# Patient Record
Sex: MALE | Race: ASIAN
[De-identification: names, ages, dates, MRNs, and addresses within clinical notes are randomized per-mention and may not be internally consistent; named-entity substitution may affect disease eponyms.]

---

## 2018-05-01 PROBLEM — Z00.00 ENCOUNTER FOR PREVENTIVE HEALTH EXAMINATION: Status: ACTIVE | Noted: 2018-05-01

## 2018-05-15 ENCOUNTER — APPOINTMENT (OUTPATIENT)
Dept: COLORECTAL SURGERY | Facility: CLINIC | Age: 48
End: 2018-05-15

## 2018-05-29 ENCOUNTER — APPOINTMENT (OUTPATIENT)
Dept: COLORECTAL SURGERY | Facility: CLINIC | Age: 48
End: 2018-05-29
Payer: COMMERCIAL

## 2018-05-29 VITALS
WEIGHT: 315 LBS | HEIGHT: 67 IN | BODY MASS INDEX: 49.44 KG/M2 | HEART RATE: 57 BPM | OXYGEN SATURATION: 98 % | DIASTOLIC BLOOD PRESSURE: 83 MMHG | SYSTOLIC BLOOD PRESSURE: 133 MMHG | RESPIRATION RATE: 14 BRPM

## 2018-05-29 DIAGNOSIS — K62.5 HEMORRHAGE OF ANUS AND RECTUM: ICD-10-CM

## 2018-05-29 DIAGNOSIS — Z86.39 PERSONAL HISTORY OF OTHER ENDOCRINE, NUTRITIONAL AND METABOLIC DISEASE: ICD-10-CM

## 2018-05-29 DIAGNOSIS — K64.9 UNSPECIFIED HEMORRHOIDS: ICD-10-CM

## 2018-05-29 PROCEDURE — 46600 DIAGNOSTIC ANOSCOPY SPX: CPT

## 2018-05-29 PROCEDURE — 99204 OFFICE O/P NEW MOD 45 MIN: CPT | Mod: 25

## 2018-05-29 RX ORDER — LISINOPRIL 2.5 MG/1
2.5 TABLET ORAL
Qty: 30 | Refills: 0 | Status: DISCONTINUED | COMMUNITY
Start: 2018-04-26

## 2018-05-29 RX ORDER — ATORVASTATIN CALCIUM 10 MG/1
10 TABLET, FILM COATED ORAL
Qty: 90 | Refills: 0 | Status: ACTIVE | COMMUNITY
Start: 2018-04-26

## 2019-03-20 ENCOUNTER — APPOINTMENT (OUTPATIENT)
Dept: PULMONOLOGY | Facility: CLINIC | Age: 49
End: 2019-03-20
Payer: MEDICAID

## 2019-03-20 VITALS
WEIGHT: 200 LBS | RESPIRATION RATE: 15 BRPM | BODY MASS INDEX: 31.39 KG/M2 | TEMPERATURE: 97.2 F | SYSTOLIC BLOOD PRESSURE: 150 MMHG | DIASTOLIC BLOOD PRESSURE: 90 MMHG | HEIGHT: 67 IN | HEART RATE: 54 BPM

## 2019-03-20 DIAGNOSIS — F17.210 NICOTINE DEPENDENCE, CIGARETTES, UNCOMPLICATED: ICD-10-CM

## 2019-03-20 DIAGNOSIS — Z78.9 OTHER SPECIFIED HEALTH STATUS: ICD-10-CM

## 2019-03-20 DIAGNOSIS — Z83.3 FAMILY HISTORY OF DIABETES MELLITUS: ICD-10-CM

## 2019-03-20 PROCEDURE — 99205 OFFICE O/P NEW HI 60 MIN: CPT

## 2019-03-20 RX ORDER — HYDROCORTISONE 25 MG/G
2.5 CREAM TOPICAL
Qty: 30 | Refills: 5 | Status: DISCONTINUED | COMMUNITY
Start: 2018-05-29 | End: 2019-03-20

## 2019-03-20 RX ORDER — PSYLLIUM HUSK 0.4 G
CAPSULE ORAL
Refills: 0 | Status: ACTIVE | COMMUNITY

## 2019-03-20 NOTE — PHYSICAL EXAM
[Normal Appearance] : normal appearance [Well Groomed] : well groomed [General Appearance - In No Acute Distress] : no acute distress [Normal Conjunctiva] : the conjunctiva exhibited no abnormalities [Low Lying Soft Palate] : low lying soft palate [Elongated Uvula] : elongated uvula [Enlarged Base of the Tongue] : enlargement of the base of the tongue [III] : III [Neck Appearance] : the appearance of the neck was normal [Neck Circumference: ___] : neck circumference is [unfilled] [Heart Rate And Rhythm] : heart rate was normal and rhythm regular [Murmurs] : no murmurs [] : no respiratory distress [Auscultation Breath Sounds / Voice Sounds] : lungs were clear to auscultation bilaterally [Involuntary Movements] : no involuntary movements were seen [No Focal Deficits] : no focal deficits [Affect] : the affect was normal [Mood] : the mood was normal [FreeTextEntry2] : no edema

## 2019-03-20 NOTE — REVIEW OF SYSTEMS
[Obesity] : obesity [Diabetes] : diabetes  [Negative] : Psychiatric [Fatigue] : no fatigue [Nasal Congestion] : no nasal congestion [Shortness Of Breath] : no shortness of breath [Palpitations] : no palpitations [Chest Pain] : no chest pain [FreeTextEntry9] : HTN, HLD [Thyroid Disease] : no thyroid disease

## 2019-03-20 NOTE — ASSESSMENT
[FreeTextEntry1] : 49 year old MAIKEL BLOOM, with HTN, HLD, DM, obesity, and a long history of obstructive sleep apnea of unknown degree, treated with CPAP, pressure unknown, presents for re-evaluation of MATHEUS.\par \par The patient derives benefit from CPAP use with improvement in sleepiness and energy.  He is experiencing mask dislodgement and leakage.  He was referred to [facility] for a diagnostic sleep study to evaluate his current degree of MATHEUS.  He will follow-up with our center in 1-week, and our office 2-weeks after the study - if he has not been contacted by then.\par \par The ramifications of MATHEUS, the benefit from CPAP to reduce medical risk, to improve sleep and daytime function, were discussed.  Treatment options will be reviewed upon receipt of his PSG report.\par \par Sleep hygiene measures were reviewed, including regularization of his sleep schedule, avoidance of engaging activities late at night, and winding down 1-hour before the desired bedtime.  He was advised on how to advance his bedtime by 1/2 hour every 3-nights if successful, with a goal of as close to 7.5 hours of sleep as is feasible.\par \par He will resume antihypertensive and atorvastatin medications.

## 2019-03-20 NOTE — HISTORY OF PRESENT ILLNESS
[Daytime Somnolence] : daytime somnolence [ESS: ___] : ESS score [unfilled] [Unintentional Sleep While Inactive] : unintentional sleep while inactive [To Bed: ___] : ~he/she~ goes to bed at [unfilled] [Arises: ___] : arises at [unfilled] [Sleep Onset Latency: ___ minutes] : sleep onset latency of [unfilled] minutes reported [Nocturnal Awakenings: ___] : ~he/she~ typically has [unfilled] nocturnal awakenings [TST: ___] : Total sleep time is [unfilled] [Date: ___] : Date of most recent diagnostic polysomnogram: [unfilled] [Daytime Sleep: ___] : daytime sleep: [unfilled] [CPAP: ___ cmH2O] : CPAP: [unfilled] cmH2O [Snoring] : no snoring [FreeTextEntry1] : This 49 year old male was referred by his PCP Dr. Maribell Baker, for management of obstructive sleep apnea.\par \par COMORBID:  Hypertension, hyperlipidemia, diabetes, obesity.\par \par The patient has been on CPAP therapy since he was diagnosed with MATHEUS in [place] > 10 years ago.  The reports are not available for review.  He reports nightly CPAP use for 5 to 6 hours, with less sleepiness and more energy.  On long drives of >1.5 hours, he experiences drowsy driving despite CPAP use.  His mask dislodges, and the noise from mask leakage disturbs his wife's sleep.  He is utilizing an approximate 3-year old self-purchased ResMed machine, with a nasal pillows mask. \par \par He sleeps 6 to 7 hours between 1-8AM.  On his days off, he arises at 10AM and feels more refreshed.  Once weekly his bedtime is later, betweeen 2 and 5AM, resulting in 4-hours of sleep.  Approximately once monthly he naps for 10-15 minutes at 2-3PM on the days following 4-hours of sleep.  Naps are refreshing.  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  He smokes <1  cigarette twice monthly, 1 glass of wine daily, 1-2 caffeinated coffes daily as late as 9pm, and denies illicit drug history.   He is not compliant with antihypertensive and atorvastatin prescribed.      [Witnessed Apneas] : no witnessed sleep apnea [Frequent Nocturnal Awakening] : no nocturnal awakening [Unintentional Sleep while Active] : no unintentional sleep while active [Awakes Unrefreshed] : does not awake unrefreshed [Awakes with Headache] : no headache upon awakening [Awakening With Dry Mouth] : no dry mouth upon awakening [Recent  Weight Gain] : no recent weight gain [DIS] : no DIS [DMS] : no DMS [Cataplexy] : no cataplexy [Unusual Sleep Behavior] : no unusual sleep behavior [Sleep Paralysis] : no sleep paralysis [Hypnagogic Hallucinations] : no hallucinations when falling asleep [Hypnopompic Hallucinations] : no hallucinations when awakening [Lower Extremity Discomfort] : no lower extremity discomfort in evening or at bedtime [Nocturnal Oxygen] : The patient does not use nocturnal oxygen

## 2019-04-29 ENCOUNTER — APPOINTMENT (OUTPATIENT)
Dept: SLEEP CENTER | Facility: CLINIC | Age: 49
End: 2019-04-29

## 2019-05-17 ENCOUNTER — OUTPATIENT (OUTPATIENT)
Dept: OUTPATIENT SERVICES | Facility: HOSPITAL | Age: 49
LOS: 1 days | End: 2019-05-17
Payer: MEDICAID

## 2019-05-17 ENCOUNTER — APPOINTMENT (OUTPATIENT)
Dept: SLEEP CENTER | Facility: CLINIC | Age: 49
End: 2019-05-17
Payer: MEDICAID

## 2019-05-17 PROCEDURE — 95811 POLYSOM 6/>YRS CPAP 4/> PARM: CPT

## 2019-05-17 PROCEDURE — 95811 POLYSOM 6/>YRS CPAP 4/> PARM: CPT | Mod: 26

## 2019-05-20 DIAGNOSIS — G47.33 OBSTRUCTIVE SLEEP APNEA (ADULT) (PEDIATRIC): ICD-10-CM

## 2019-06-07 ENCOUNTER — RESULT REVIEW (OUTPATIENT)
Age: 49
End: 2019-06-07

## 2019-08-16 ENCOUNTER — APPOINTMENT (OUTPATIENT)
Dept: PULMONOLOGY | Facility: CLINIC | Age: 49
End: 2019-08-16
Payer: MEDICAID

## 2019-08-16 VITALS
WEIGHT: 193 LBS | OXYGEN SATURATION: 98 % | DIASTOLIC BLOOD PRESSURE: 69 MMHG | SYSTOLIC BLOOD PRESSURE: 119 MMHG | TEMPERATURE: 98.1 F | RESPIRATION RATE: 16 BRPM | HEART RATE: 54 BPM | BODY MASS INDEX: 30.23 KG/M2

## 2019-08-16 DIAGNOSIS — I10 ESSENTIAL (PRIMARY) HYPERTENSION: ICD-10-CM

## 2019-08-16 DIAGNOSIS — G47.33 OBSTRUCTIVE SLEEP APNEA (ADULT) (PEDIATRIC): ICD-10-CM

## 2019-08-16 PROCEDURE — 99214 OFFICE O/P EST MOD 30 MIN: CPT

## 2019-08-16 RX ORDER — METFORMIN ER 500 MG 500 MG/1
500 TABLET ORAL
Refills: 0 | Status: ACTIVE | COMMUNITY
Start: 2018-04-22

## 2019-08-16 RX ORDER — LISINOPRIL 2.5 MG/1
2.5 TABLET ORAL
Refills: 0 | Status: ACTIVE | COMMUNITY
Start: 2019-07-01

## 2019-08-16 NOTE — ASSESSMENT
[FreeTextEntry1] : 49 year old MAIKEL BLOOM with  HTN, HLD, DM, obesity class I.; continues to derive benefit from CPAP therapy for severe obstructive sleep apnea.\par \par A review of therapeutic and compliance data reveals usage on 100% of nights averaging 6 hours 14 minutes; effective pressure at 12 cmH2O with therapy based AHI of 6.4, but SIGNIFICANT MASK LEAK.  The patient will secure his mask and monitor for excessive leakage.  Repeat download in one week.  If mask leak persists, he will schedule a mask fitting.   \par \par The patient is benefitting from CPAP therapy with continued resolution of MATHEUS-related symptoms and should continue CPAP therapy at the current pressure setting.  \par \par He will follow up in one year or sooner if needed.\par \par \par \par \par \par

## 2019-08-16 NOTE — REVIEW OF SYSTEMS
Vimal [Obesity] : obesity [Negative] : Neurologic [Fatigue] : no fatigue [Shortness Of Breath] : no shortness of breath [Nasal Congestion] : no nasal congestion [Chest Pain] : no chest pain [Palpitations] : no palpitations [Edema] : ~T edema was not present [Thyroid Disease] : no thyroid disease [Diabetes] : no diabetes  [History of Iron Deficiency] : no history of iron deficiency [Heartburn] : no heartburn [Nocturia] : no nocturia [Depression] : no depression [Arthralgias] : no arthralgias [Anxious] : not anxious

## 2019-08-16 NOTE — PHYSICAL EXAM
[Normal Appearance] : normal appearance [Well Groomed] : well groomed [General Appearance - In No Acute Distress] : no acute distress [Normal Conjunctiva] : the conjunctiva exhibited no abnormalities [Low Lying Soft Palate] : low lying soft palate [Elongated Uvula] : elongated uvula [Enlarged Base of the Tongue] : enlargement of the base of the tongue [III] : III [Neck Appearance] : the appearance of the neck was normal [Murmurs] : no murmurs [Heart Rate And Rhythm] : heart rate was normal and rhythm regular [] : no respiratory distress [Auscultation Breath Sounds / Voice Sounds] : lungs were clear to auscultation bilaterally [Involuntary Movements] : no involuntary movements were seen [No Focal Deficits] : no focal deficits [Affect] : the affect was normal [Mood] : the mood was normal [FreeTextEntry2] : no edema

## 2019-08-16 NOTE — HISTORY OF PRESENT ILLNESS
[AHI: ___ per hour] : Apnea-hypopnea index:  [unfilled] per hour [T90%: ___] : T90%: [unfilled]% [Helder desatuation%: ___] : Helder desaturation:  [unfilled]% [Date: ___] : the most recent therapeutic polysomnogram was completed [unfilled] [CPAP: ___ cmH2O] : CPAP: [unfilled] cmH2O [Sleep Onset Latency: ___ minutes] : sleep onset latency of [unfilled] minutes reported [Nocturnal Awakenings: ___] : ~he/she~ typically has [unfilled] nocturnal awakenings [ESS: ___] : ESS score [unfilled] [FreeTextEntry1] : This 49 year old male presents for management of severe obstructive sleep apnea, > one month after receiving a new replacement CPAP to continue therapy.\par \par COMORBID:  HTN, HLD, DM, obesity class I.\par \par THERAPY:  The patient has been treated with CPAP therapy since he was diagnosed with MATHEUS in [place] >10 years ago.  He tolerates current CPAP pressure of 12 cmH2O.  The Eson 2 standard nasal mask occasionally leaks, but he admits he secures it loosely..  No supplies are needed at this time.\par \par COMPLIANCE:  He reports nightly CPAP use for 6-7  hours.\par BENEFIT:  improvement in somnolence and energy level.  \par \par INTERIM CHANGES:  7-lb intentional weight loss, and he started Lisinopril for HTN.\par \par SLEEPIN-7 hours between 1-8AM.  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 [Snoring] : no snoring [Frequent Nocturnal Awakening] : no nocturnal awakening [Witnessed Apneas] : no witnessed sleep apnea [Daytime Somnolence] : no daytime somnolence [Unintentional Sleep while Active] : no unintentional sleep while active [Unintentional Sleep While Inactive] : no unintentional sleep while inactive [Awakes Unrefreshed] : does not awake unrefreshed [Awakes with Headache] : no headache upon awakening [Awakening With Dry Mouth] : no dry mouth upon awakening [Recent  Weight Gain] : no recent weight gain [DIS] : no DIS [Lower Extremity Discomfort] : no lower extremity discomfort in evening or at bedtime [DMS] : no DMS [Nocturnal Oxygen] : The patient does not use nocturnal oxygen

## 2019-08-22 ENCOUNTER — FORM ENCOUNTER (OUTPATIENT)
Age: 49
End: 2019-08-22

## 2022-01-25 ENCOUNTER — APPOINTMENT (OUTPATIENT)
Dept: PULMONOLOGY | Facility: CLINIC | Age: 52
End: 2022-01-25
Payer: COMMERCIAL

## 2022-01-25 DIAGNOSIS — Z99.89 OBSTRUCTIVE SLEEP APNEA (ADULT) (PEDIATRIC): ICD-10-CM

## 2022-01-25 DIAGNOSIS — G47.33 OBSTRUCTIVE SLEEP APNEA (ADULT) (PEDIATRIC): ICD-10-CM

## 2022-01-25 PROCEDURE — 99213 OFFICE O/P EST LOW 20 MIN: CPT | Mod: 95

## 2022-01-25 NOTE — HISTORY OF PRESENT ILLNESS
[Home] : at home, [unfilled] , at the time of the visit. [Other Location: e.g. Home (Enter Location, City,State)___] : at [unfilled] [Verbal consent obtained from patient] : the patient, [unfilled] [FreeTextEntry1] : 52 year old male MAIKEL BLOOM  on CPAP therapy for obstructive sleep apnea since <. Last visit .\par ·	Comorbid: HTN, HLD, DM, obesity class I.\par \par SEVERE MATHEUS (AHI 63.8) on CPAP 12 cmH2O, using a nasal mask.\par \par ·	DX before  in [place], on CPAP since.\par ·	Split study 2019: AHI 63.8. T90 13.4%. Lowest sat 74%. AHI reduced to 2.8 on CPAP 12.\par ·	[place]:  2019 AirSense 10 Elite from [facility]  \par ·	Data Download 2022: Compliant 80% of days, average use 6 hours 59 mins. \par ·	.....Therapeutic AHI 2.1. Excessive mask leak (average 102.8 L/m).  \par \par PATIENT REPORTS\par ·	CPAP nightly improves sleep and energy level, resolves fatigue.\par ·	Uses a travel CPAP (recently x 6-days in [place]).\par ·	C/O recent increase in mask leak - he ordered a full-face mask to try\par ·	Sleeping 5-7 hours. 12:30-8am. Very quick SOL. No awakenings or naps. \par ·	Sleepy while reading, on computer, or after <5 hours sleep, as after late calls to [place].\par ·	Dozes only on the weekend.\par ·	Occupation: wholesale import for 8-hours daytime.\par \par EPWORTH SLEEPINESS SCALE\par \par How likely are you to doze off or fall asleep in the sitSuations described below, in contrast to feeling just tired?  This refers to your usual way of life in recent times.  Even if you haven't done some of these things recently, try to work out how they would have affected you.  Use the following scale to choose one most appropriate number for each situation.......Chance of dozin= never. 1= slight. 2= moderate. 3= high.\par \par CHANCE OF DOZING............SITUATION\par 1.............................................Sitting and reading\par 2.............................................Watching TV\par 1.............................................Sitting inactive in a public place (eg a theatre or a meeting)\par 3.............................................As a passenger in a car for an hour without a break\par 3.............................................Lying down to rest in the afternoon when circumstances permit\par 0............................................Sitting and talking to someone\par 3.............................................Sitting quietly after lunch without alcohol\par 1.............................................In a car, while stopped for a few minutes in traffic\par \par 14............................................TOTAL ESS SCORE\par

## 2022-01-25 NOTE — REVIEW OF SYSTEMS
[Diabetes] : diabetes  [Negative] : Psychiatric [Fatigue] : no fatigue [Snoring] : no snoring [Witnessed Apneas] : demonstrated no ~M apnea [Frequent Nocturnal Awakenings] : no nocturnal awakenings from sleep [Daytime Somnolence: ESS=____] : no daytime somnolence [Unintentional Sleep while active] : no unintentional sleep while active [Unintentional Sleep while inactive] : no unintentional sleep while inactive [Awakes Unrefreshed] : restorative sleep [Awakes With Headache] : awakes without a headache [Awakes With Dry Mouth] : awakes without dry mouth [Recent Wt Gain (___ Lbs)] : no recent weight gain [Difficulty Initiating Sleep] : no difficulty falling asleep [Difficulty Maintaining Sleep] : no difficulty maintaining sleep [Irresistible urge to move legs] : no irresistible urge to move legs because of lower extremity discomfort [Unusual Sleep Behavior] : no unusual sleep behavior [FreeTextEntry3] : Stable weight. currently 193-lbs stated weight.

## 2022-01-25 NOTE — ASSESSMENT
[FreeTextEntry1] : 52 year old MAIKEL BLOOM continues to derive benefit from CPAP therapy for obstructive sleep apnea since <2009.\par ·	Comorbid: HTN, HLD, DM, obesity class I.\par \par SEVERE MATHEUS (AHI 63.8) on CPAP 12 cmH2O, using a nasal mask.\par \par ·	CPAP resolves prior fatigue, improves sleep and energy level.  \par ·	[place] Sleepiness Scale score of 14.  \par ·	Good CPAP compliance averaging almost 7 hours, excellent therapeutic response (AHI 2.1)\par ·	Excessive mask leak persists (102.8 L/m average).    \par \par PLAN:\par ·	Renewal of supplies from [facility] with best fit mask, to continue therapy.\par ·	Despite excessive mask leak, AHI is within normal limits.\par ·	Patient will try a different style mask.  He was offered a mask fitting if problem persists.\par ·	Follow-up in one year or sooner if needed.\par \par \par \par \par

## 2025-02-27 ENCOUNTER — APPOINTMENT (OUTPATIENT)
Dept: UROLOGY | Facility: CLINIC | Age: 55
End: 2025-02-27
Payer: MEDICAID

## 2025-02-27 ENCOUNTER — NON-APPOINTMENT (OUTPATIENT)
Age: 55
End: 2025-02-27

## 2025-02-27 VITALS
BODY MASS INDEX: 30.61 KG/M2 | HEART RATE: 67 BPM | DIASTOLIC BLOOD PRESSURE: 86 MMHG | HEIGHT: 67 IN | SYSTOLIC BLOOD PRESSURE: 149 MMHG | WEIGHT: 195 LBS

## 2025-02-27 DIAGNOSIS — D29.1 BENIGN NEOPLASM OF PROSTATE: ICD-10-CM

## 2025-02-27 DIAGNOSIS — N40.1 BENIGN PROSTATIC HYPERPLASIA WITH LOWER URINARY TRACT SYMPMS: ICD-10-CM

## 2025-02-27 DIAGNOSIS — K62.5 HEMORRHAGE OF ANUS AND RECTUM: ICD-10-CM

## 2025-02-27 DIAGNOSIS — N13.8 BENIGN PROSTATIC HYPERPLASIA WITH LOWER URINARY TRACT SYMPMS: ICD-10-CM

## 2025-02-27 DIAGNOSIS — G47.33 OBSTRUCTIVE SLEEP APNEA (ADULT) (PEDIATRIC): ICD-10-CM

## 2025-02-27 PROCEDURE — 99204 OFFICE O/P NEW MOD 45 MIN: CPT | Mod: 25

## 2025-02-27 PROCEDURE — 51798 US URINE CAPACITY MEASURE: CPT

## 2025-02-27 RX ORDER — TAMSULOSIN HYDROCHLORIDE 0.4 MG/1
0.4 CAPSULE ORAL
Qty: 180 | Refills: 3 | Status: ACTIVE | COMMUNITY
Start: 2025-02-27 | End: 1900-01-01

## 2025-03-01 LAB
ANION GAP SERPL CALC-SCNC: 12 MMOL/L
APPEARANCE: CLEAR
BACTERIA: NEGATIVE /HPF
BILIRUBIN URINE: NEGATIVE
BLOOD URINE: NEGATIVE
BUN SERPL-MCNC: 20 MG/DL
CALCIUM SERPL-MCNC: 9.3 MG/DL
CAST: 0 /LPF
CHLORIDE SERPL-SCNC: 106 MMOL/L
CO2 SERPL-SCNC: 27 MMOL/L
COLOR: YELLOW
CREAT SERPL-MCNC: 1.24 MG/DL
EGFR: 68 ML/MIN/1.73M2
EPITHELIAL CELLS: 0 /HPF
GLUCOSE QUALITATIVE U: 250 MG/DL
GLUCOSE SERPL-MCNC: 168 MG/DL
KETONES URINE: NEGATIVE MG/DL
LEUKOCYTE ESTERASE URINE: NEGATIVE
MICROSCOPIC-UA: NORMAL
NITRITE URINE: NEGATIVE
PH URINE: 6
POTASSIUM SERPL-SCNC: 4.3 MMOL/L
PROTEIN URINE: NEGATIVE MG/DL
PSA FREE FLD-MCNC: 20 %
PSA FREE SERPL-MCNC: 0.42 NG/ML
PSA SERPL-MCNC: 2.07 NG/ML
RED BLOOD CELLS URINE: 1 /HPF
SODIUM SERPL-SCNC: 144 MMOL/L
SPECIFIC GRAVITY URINE: 1.02
UROBILINOGEN URINE: 0.2 MG/DL
WHITE BLOOD CELLS URINE: 1 /HPF

## 2025-04-03 ENCOUNTER — APPOINTMENT (OUTPATIENT)
Dept: UROLOGY | Facility: CLINIC | Age: 55
End: 2025-04-03
Payer: MEDICAID

## 2025-04-03 DIAGNOSIS — N40.1 BENIGN PROSTATIC HYPERPLASIA WITH LOWER URINARY TRACT SYMPMS: ICD-10-CM

## 2025-04-03 DIAGNOSIS — N13.8 BENIGN PROSTATIC HYPERPLASIA WITH LOWER URINARY TRACT SYMPMS: ICD-10-CM

## 2025-04-03 PROCEDURE — G2211 COMPLEX E/M VISIT ADD ON: CPT | Mod: NC

## 2025-04-03 PROCEDURE — 99214 OFFICE O/P EST MOD 30 MIN: CPT
